# Patient Record
Sex: FEMALE | Race: WHITE | NOT HISPANIC OR LATINO | Employment: UNEMPLOYED | URBAN - METROPOLITAN AREA
[De-identification: names, ages, dates, MRNs, and addresses within clinical notes are randomized per-mention and may not be internally consistent; named-entity substitution may affect disease eponyms.]

---

## 2019-04-17 ENCOUNTER — TELEPHONE (OUTPATIENT)
Dept: FAMILY MEDICINE | Facility: CLINIC | Age: 39
End: 2019-04-17

## 2019-04-17 DIAGNOSIS — B19.10 HEP B W/O COMA: Primary | ICD-10-CM

## 2019-04-17 RX ORDER — TENOFOVIR DISOPROXIL FUMARATE 300 MG/1
TABLET, FILM COATED ORAL
COMMUNITY
End: 2019-05-20 | Stop reason: SDUPTHER

## 2019-05-20 ENCOUNTER — OFFICE VISIT (OUTPATIENT)
Dept: FAMILY MEDICINE | Facility: CLINIC | Age: 39
End: 2019-05-20
Payer: COMMERCIAL

## 2019-05-20 VITALS
DIASTOLIC BLOOD PRESSURE: 70 MMHG | SYSTOLIC BLOOD PRESSURE: 115 MMHG | HEIGHT: 65 IN | RESPIRATION RATE: 18 BRPM | BODY MASS INDEX: 31.49 KG/M2 | HEART RATE: 93 BPM | OXYGEN SATURATION: 98 % | WEIGHT: 189 LBS

## 2019-05-20 DIAGNOSIS — B19.10 HEP B W/O COMA: Primary | ICD-10-CM

## 2019-05-20 PROCEDURE — 99213 PR OFFICE/OUTPT VISIT, EST, LEVL III, 20-29 MIN: ICD-10-PCS | Mod: S$GLB,,, | Performed by: INTERNAL MEDICINE

## 2019-05-20 PROCEDURE — 99213 OFFICE O/P EST LOW 20 MIN: CPT | Mod: S$GLB,,, | Performed by: INTERNAL MEDICINE

## 2019-05-20 RX ORDER — TENOFOVIR DISOPROXIL FUMARATE 300 MG/1
TABLET, FILM COATED ORAL
Qty: 90 TABLET | Refills: 3 | Status: SHIPPED | OUTPATIENT
Start: 2019-05-20 | End: 2020-02-06 | Stop reason: SDUPTHER

## 2019-05-20 NOTE — PROGRESS NOTES
Subjective:       Patient ID: Sherin Bowser is a 39 y.o. female.    Chief Complaint: Follow-up (6mn viral HEP B  Discuss labs.)    Here for follow up on Hep B.  Doing well at this time.  No adverse effects from therapy.  Has been 100% compliant with therapy in the past two weeks.  Interested in finding ways to accelerate clearance of HBV.  Discussed that she is surface Ag negative and viral load has been suppressed for several years.  No other complaints at this time.    Review of Systems   Constitutional: Negative for chills and fever.   HENT: Negative for sore throat.    Respiratory: Negative for cough and shortness of breath.    Cardiovascular: Negative for chest pain and palpitations.   Gastrointestinal: Negative for diarrhea, nausea and vomiting.   Musculoskeletal: Negative for arthralgias, joint swelling and myalgias.   Skin: Negative for rash and wound.       Objective:      Physical Exam   Constitutional: She appears well-developed and well-nourished.   Cardiovascular: Normal rate and regular rhythm.   No murmur heard.  Pulmonary/Chest: Effort normal and breath sounds normal. No respiratory distress. She has no wheezes.   Abdominal: Soft. Bowel sounds are normal. There is no tenderness.   Skin: Skin is warm and dry. No rash noted.   Nursing note and vitals reviewed.      Assessment:       1. Hep B w/o coma        Plan:       Problem List Items Addressed This Visit        GI    Hep B w/o coma - Primary     Not controlled as antibody remains negative.  Viral load is undetectable however, and surface antigen is negative and has been since 2017.  Reviewed and discussed labs at length with patient and spouse.  Reviewed literature as well and at this point could consider stopping therapy.  If we do that will need to monitor HBV DNA and ALT monthly for 6 months and then q3 months for a year.  Also likely need to repeat liver U/S.  Patient reluctant to stop therapy but will consider it.

## 2019-05-20 NOTE — ASSESSMENT & PLAN NOTE
Not controlled as antibody remains negative.  Viral load is undetectable however, and surface antigen is negative and has been since 2017.  Reviewed and discussed labs at length with patient and spouse.  Reviewed literature as well and at this point could consider stopping therapy.  If we do that will need to monitor HBV DNA and ALT monthly for 6 months and then q3 months for a year.  Also likely need to repeat liver U/S.  Patient reluctant to stop therapy but will consider it.

## 2019-09-12 ENCOUNTER — TELEPHONE (OUTPATIENT)
Dept: FAMILY MEDICINE | Facility: CLINIC | Age: 39
End: 2019-09-12

## 2019-09-12 RX ORDER — BUSPIRONE HYDROCHLORIDE 5 MG/1
5 TABLET ORAL 2 TIMES DAILY
Qty: 60 TABLET | Refills: 11 | Status: SHIPPED | OUTPATIENT
Start: 2019-09-12 | End: 2020-09-11

## 2019-09-12 NOTE — TELEPHONE ENCOUNTER
----- Message from Zahira Pisano sent at 9/5/2019  9:46 AM CDT -----  Contact: self  Pt needs rx refill of bustar(5mg) taken as needed called out to [facility] in [place]. Pt asked if she could do the 5 mg instead of 10mg because she said the 10 mg is too strong.

## 2020-01-23 ENCOUNTER — TELEPHONE (OUTPATIENT)
Dept: FAMILY MEDICINE | Facility: CLINIC | Age: 40
End: 2020-01-23

## 2020-01-23 DIAGNOSIS — B19.10 HEP B W/O COMA: Primary | ICD-10-CM

## 2020-02-06 DIAGNOSIS — B19.10 HEP B W/O COMA: Primary | ICD-10-CM

## 2020-02-06 RX ORDER — TENOFOVIR DISOPROXIL FUMARATE 300 MG/1
TABLET, FILM COATED ORAL
Qty: 90 TABLET | Refills: 3 | Status: SHIPPED | OUTPATIENT
Start: 2020-02-06

## 2020-02-21 ENCOUNTER — TELEPHONE (OUTPATIENT)
Dept: FAMILY MEDICINE | Facility: CLINIC | Age: 40
End: 2020-02-21